# Patient Record
Sex: FEMALE | Race: WHITE | HISPANIC OR LATINO | Employment: STUDENT | URBAN - METROPOLITAN AREA
[De-identification: names, ages, dates, MRNs, and addresses within clinical notes are randomized per-mention and may not be internally consistent; named-entity substitution may affect disease eponyms.]

---
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
## 2023-02-24 ENCOUNTER — TELEPHONE (OUTPATIENT)
Dept: PEDIATRICS | Facility: CLINIC | Age: 18
End: 2023-02-24
Payer: MEDICAID

## 2023-12-18 ENCOUNTER — TELEPHONE (OUTPATIENT)
Dept: OBSTETRICS AND GYNECOLOGY | Facility: CLINIC | Age: 18
End: 2023-12-18
Payer: MEDICAID

## 2023-12-18 NOTE — TELEPHONE ENCOUNTER
Spoke with pt. She wanted a sooner appointment. Offered another appointment with Dr. Villalobos in January. Pt preferred female provider, but accepted appointment with Dr. Villalobos due to it being earlier. Pt was already seeking care in [place], but recently moved here and needs new ob.

## 2024-01-31 ENCOUNTER — PATIENT MESSAGE (OUTPATIENT)
Dept: OBSTETRICS AND GYNECOLOGY | Facility: CLINIC | Age: 19
End: 2024-01-31
Payer: MEDICAID

## 2024-02-02 ENCOUNTER — TELEPHONE (OUTPATIENT)
Dept: OBSTETRICS AND GYNECOLOGY | Facility: CLINIC | Age: 19
End: 2024-02-02
Payer: MEDICAID

## 2024-02-02 NOTE — TELEPHONE ENCOUNTER
Spoke with pt. Verified pt name and .    Explained to pt that Dr. Kwong will be out of the office for 12 weeks due to an emergency and we will need to reschedule her appointment.     She already had another appointment with a provider at .    Canceled her appointment.    There was some confusion between her and her sister. Her sister Wilner Lobo's phone number was on Shaunna Rawlsn's chart.    Switched her number to the correct number and also changed her address since that was wrong as well.    Pt had no further questions.

## 2024-09-05 ENCOUNTER — TELEPHONE (OUTPATIENT)
Dept: OBSTETRICS AND GYNECOLOGY | Facility: CLINIC | Age: 19
End: 2024-09-05
Payer: MEDICAID

## 2024-09-05 NOTE — TELEPHONE ENCOUNTER
----- Message from Sylvia Khalil sent at 9/5/2024  9:04 AM CDT -----  Type:  Needs Medical Advice/IUD new patient     Who Called: pt     Would the patient rather a call back or a response via [facility]? Call   Best Call Back Number: [phone]  Additional Information: pt requesting a IUD appointment denied scheduling

## 2024-10-08 ENCOUNTER — OFFICE VISIT (OUTPATIENT)
Dept: OBSTETRICS AND GYNECOLOGY | Facility: CLINIC | Age: 19
End: 2024-10-08
Payer: MEDICAID

## 2024-10-08 VITALS — WEIGHT: 175.5 LBS | SYSTOLIC BLOOD PRESSURE: 109 MMHG | BODY MASS INDEX: 30.98 KG/M2 | DIASTOLIC BLOOD PRESSURE: 73 MMHG

## 2024-10-08 DIAGNOSIS — Z30.014 ENCOUNTER FOR INITIAL PRESCRIPTION OF INTRAUTERINE CONTRACEPTIVE DEVICE (IUD): Primary | ICD-10-CM

## 2024-10-08 PROCEDURE — 99999 PR PBB SHADOW E&M-EST. PATIENT-LVL III: CPT | Mod: PBBFAC,,, | Performed by: OBSTETRICS & GYNECOLOGY

## 2024-10-08 PROCEDURE — 99213 OFFICE O/P EST LOW 20 MIN: CPT | Mod: PBBFAC,PO | Performed by: OBSTETRICS & GYNECOLOGY

## 2024-10-08 NOTE — PROGRESS NOTES
20 yo  female who presents to discuss contraception management and pap smear results from [facility]>  Had pap in 2024. Wants second opinion about pap. Noted to have ASCUS +HR HPV pap.  Was on depo provera but did not like weight gain.  Wants to have IUD.    ROS:  GENERAL: Denies weight gain or weight loss. Feeling well overall.   SKIN: Denies rash or lesions.   HEAD: Denies head injury or headache.   CHEST: Denies chest pain or shortness of breath.   CARDIOVASCULAR: Denies palpitations or left sided chest pain.   ABDOMEN: No abdominal pain, constipation, diarrhea, nausea, vomiting or rectal bleeding.   URINARY: No frequency, dysuria, hematuria, or burning on urination.  REPRODUCTIVE: See HPI.   BREASTS:  denies pain, lumps, or nipple discharge.   HEMATOLOGIC: No easy bruisability or excessive bleeding.   MUSCULOSKELETAL: Denies joint pain or swelling.   NEUROLOGIC: Denies syncope or weakness.   PSYCHIATRIC: Denies depression, anxiety or mood swings.         PE  /73 (Patient Position: Sitting)   Wt 79.6 kg (175 lb 7.8 oz)   LMP 10/06/2024 (Exact Date)   BMI 30.98 kg/m²   On speculum exam, blood noted as patient on her cycle.  Cervix grasped with single tooth and then cleaned with betadine.  Pipelle inserted without difficulty and uterus sounded to 9cm.      AP  Contraception: office UPT negative, mirena IUd ordered  Cervical cancer screening: patient reassured about results from [facility].  Recommend repeat pap in 1 yr.

## 2024-11-15 ENCOUNTER — PROCEDURE VISIT (OUTPATIENT)
Dept: OBSTETRICS AND GYNECOLOGY | Facility: CLINIC | Age: 19
End: 2024-11-15
Payer: MEDICAID

## 2024-11-15 VITALS
BODY MASS INDEX: 32.18 KG/M2 | SYSTOLIC BLOOD PRESSURE: 105 MMHG | WEIGHT: 182.31 LBS | DIASTOLIC BLOOD PRESSURE: 62 MMHG

## 2024-11-15 DIAGNOSIS — Z30.430 ENCOUNTER FOR IUD INSERTION: Primary | ICD-10-CM

## 2024-11-15 DIAGNOSIS — Z30.014 ENCOUNTER FOR INITIAL PRESCRIPTION OF INTRAUTERINE CONTRACEPTIVE DEVICE (IUD): ICD-10-CM

## 2024-11-15 DIAGNOSIS — Z11.3 SCREENING EXAMINATION FOR STD (SEXUALLY TRANSMITTED DISEASE): ICD-10-CM

## 2024-11-15 PROCEDURE — 87491 CHLMYD TRACH DNA AMP PROBE: CPT | Performed by: OBSTETRICS & GYNECOLOGY

## 2024-11-15 NOTE — PROCEDURES
MIRENA INSERTION:  Date:11/15/2024  Time:4:59 PM  Name of the procedure: Mirena Insertion  Indications: Shaunna Alarcon is a 19 y.o. female  who presents today for insertion of Mirena.  Patient's last menstrual period was 2024 (exact date)..      PRE-Mirena INSERTION COUNSELING:  All contraceptive options were reviewed and the patient chooses Mirena.  Patients history was reviewed and there were no contraindications to Mirena.  The procedure and minimal risks of infection, bleeding, and uterine perforation at at  insertion have been discussed. Possible irregular menstrual bleeding pattern versus amenorrhea was explained. No protection against STDs discussed.    Consents: Risks/benefits of the procedure were discussed with the patient. Patient's questions were answered.  Consents signed.      Labs:    Office urine pregnancy test negative;   Gonorrhea/Chlamydia collected    Procedure:   TIME OUT PERFORMED.  Speculum inserted and cervix visualized.  Cervix swabbed with betadine x 1; Single tooth tenaculum placed on anterior cervix. Endometrial pipelle inserted and uterus sounded to 8 cm. Mirena IUD then inserted using standard technique. IUD string cut about 4cm in length.  Tenaculum removed and hemostasis acheived. All instruments removed from the vagina.  Patient tolerated the procedure well.    Complications: none    EBL: min    Disposition: Pt tolerated the procedure well.      A/P:   -s/p successful insertion of Mirena IUD  -Motrin prn pain  -instructed to report nausea/vomiting/purulent discharge to MD  -instructed to use condoms for at least 1 week after insertion to avoid undesired pregnancy  -f/u 6-8 wks for IUD check    JOHNNY Kwong MD

## 2024-12-14 ENCOUNTER — PATIENT MESSAGE (OUTPATIENT)
Dept: OBSTETRICS AND GYNECOLOGY | Facility: CLINIC | Age: 19
End: 2024-12-14
Payer: MEDICAID

## 2025-06-03 ENCOUNTER — PATIENT MESSAGE (OUTPATIENT)
Dept: CARDIOLOGY | Facility: CLINIC | Age: 20
End: 2025-06-03
Payer: MEDICAID

## 2025-07-19 NOTE — PROGRESS NOTES
"[facility] [address]     SUBJECTIVE:     History of Present Illness:  Patient is a 20 y.o. female presents with chest pains for the past 6 months; not often but once a month. Heavy feeling in upper chest; random occurrence; has had it after meals; standing etc. Lasts for 5 mins; no radiation; last episode was , around 1pm.     Primary Diagnosis:   Hypertension: none  DM:none  Smoker: none  Family history of early CAD: positive at young age   Heart disease : none  ROS  No shortness of breath; no PND or orthopnea  No syncope  No palpitations  Blood pressures at home: normal  Activity: works making coffee; goes to school ; sometimes has had this with activity; works out running and in [employer]; never had this with working out or running ; chases one year old with no symptoms   Review of patient's allergies indicates:  No Known Allergies    History reviewed. No pertinent past medical history.    History reviewed. No pertinent surgical history.        Past Hospitalization:         Cardiac meds:  none        OBJECTIVE:     Vital Signs (Most Recent)  Vitals:    25 1041   BP: 124/81   Pulse: 73   SpO2: 98%   Weight: 83.4 kg (183 lb 15.6 oz)   Height: 5' 3" (1.6 m)         Physical Exam:  Neck: normal carotids, no bruits; normal JVP  Lungs :clear  Heart: RR, normal S1,S2, no murmurs, no gallops  Abd: no masses; no bruits;   Exts: normal DP and PT pulses bilaterally, normal radials; no edema noted               Labs  none    Diagnostic Results:    1.EK.Echo:   3. Stress test  4. cath    Chart review:        ASSESSMENT/PLAN:     Noncardiac chest pain: no risk factors ; none with physical activity like running or working out at [employer]  No labs available      Plan: reassurance  Weight loss  Needs primary and blood tests to check lipids, A1c etc.     Alexandr Wisdom MD    "

## 2025-07-22 ENCOUNTER — OFFICE VISIT (OUTPATIENT)
Dept: CARDIOLOGY | Facility: CLINIC | Age: 20
End: 2025-07-22
Payer: MEDICAID

## 2025-07-22 VITALS
WEIGHT: 184 LBS | HEIGHT: 63 IN | DIASTOLIC BLOOD PRESSURE: 81 MMHG | BODY MASS INDEX: 32.6 KG/M2 | SYSTOLIC BLOOD PRESSURE: 124 MMHG | OXYGEN SATURATION: 98 % | HEART RATE: 73 BPM

## 2025-07-22 DIAGNOSIS — R07.2 PRECORDIAL PAIN: Primary | ICD-10-CM

## 2025-07-22 PROCEDURE — 1160F RVW MEDS BY RX/DR IN RCRD: CPT | Mod: CPTII,,, | Performed by: INTERNAL MEDICINE

## 2025-07-22 PROCEDURE — 3074F SYST BP LT 130 MM HG: CPT | Mod: CPTII,,, | Performed by: INTERNAL MEDICINE

## 2025-07-22 PROCEDURE — 3079F DIAST BP 80-89 MM HG: CPT | Mod: CPTII,,, | Performed by: INTERNAL MEDICINE

## 2025-07-22 PROCEDURE — 1159F MED LIST DOCD IN RCRD: CPT | Mod: CPTII,,, | Performed by: INTERNAL MEDICINE

## 2025-07-22 PROCEDURE — 99213 OFFICE O/P EST LOW 20 MIN: CPT | Mod: PBBFAC,PN | Performed by: INTERNAL MEDICINE

## 2025-07-22 PROCEDURE — 99204 OFFICE O/P NEW MOD 45 MIN: CPT | Mod: S$PBB,,, | Performed by: INTERNAL MEDICINE

## 2025-07-22 PROCEDURE — 3008F BODY MASS INDEX DOCD: CPT | Mod: CPTII,,, | Performed by: INTERNAL MEDICINE

## 2025-07-22 PROCEDURE — 99999 PR PBB SHADOW E&M-EST. PATIENT-LVL III: CPT | Mod: PBBFAC,,, | Performed by: INTERNAL MEDICINE
